# Patient Record
Sex: MALE | Race: WHITE | Employment: STUDENT | URBAN - METROPOLITAN AREA
[De-identification: names, ages, dates, MRNs, and addresses within clinical notes are randomized per-mention and may not be internally consistent; named-entity substitution may affect disease eponyms.]

---
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
## 2019-02-10 ENCOUNTER — OFFICE VISIT (OUTPATIENT)
Dept: FAMILY MEDICINE CLINIC | Facility: CLINIC | Age: 19
End: 2019-02-10
Payer: COMMERCIAL

## 2019-02-10 VITALS
OXYGEN SATURATION: 99 % | DIASTOLIC BLOOD PRESSURE: 76 MMHG | BODY MASS INDEX: 21 KG/M2 | WEIGHT: 158 LBS | RESPIRATION RATE: 14 BRPM | TEMPERATURE: 97 F | SYSTOLIC BLOOD PRESSURE: 103 MMHG | HEART RATE: 87 BPM

## 2019-02-10 DIAGNOSIS — H65.91 RIGHT NON-SUPPURATIVE OTITIS MEDIA: Primary | ICD-10-CM

## 2019-02-10 DIAGNOSIS — R05.9 COUGH: ICD-10-CM

## 2019-02-10 PROCEDURE — 99213 OFFICE O/P EST LOW 20 MIN: CPT | Performed by: PHYSICIAN ASSISTANT

## 2019-02-10 RX ORDER — AMOXICILLIN AND CLAVULANATE POTASSIUM 875; 125 MG/1; MG/1
1 TABLET, FILM COATED ORAL 2 TIMES DAILY
Qty: 20 TABLET | Refills: 0 | Status: SHIPPED | OUTPATIENT
Start: 2019-02-10 | End: 2019-02-20

## 2019-02-10 RX ORDER — DEXTROMETHORPHAN HYDROBROMIDE AND PROMETHAZINE HYDROCHLORIDE 15; 6.25 MG/5ML; MG/5ML
5 SYRUP ORAL 4 TIMES DAILY PRN
Qty: 118 ML | Refills: 0 | Status: SHIPPED | OUTPATIENT
Start: 2019-02-10 | End: 2019-02-20

## 2019-02-10 NOTE — PROGRESS NOTES
CHIEF COMPLAINT:   Patient presents with:  Cough        HPI:   Maxine Giang is a 22year old male who presents for cough for one week. Started with sore throat with white spots. Throat remains sore, but white spots have resolved  .  Headaches at the s Mild erythema of the throat. PND noted. No tonsillar enlargement or exudates   NECK: supple, non-tender. LUNGS: Normal respiratory rate. Normal effort. Dry cough. No wheezing. No rales or crackles. . No decreased BS.    CARDIO: RRR without murmur  LYMPH: N

## 2019-02-10 NOTE — PATIENT INSTRUCTIONS
Rest   Fluids   Ibuprofen/tylenol OTC as needed   Please follow up with PCP if no improvement or if symptoms worsen

## 2019-04-24 ENCOUNTER — OFFICE VISIT (OUTPATIENT)
Dept: FAMILY MEDICINE CLINIC | Facility: CLINIC | Age: 19
End: 2019-04-24
Payer: COMMERCIAL

## 2019-04-24 VITALS
TEMPERATURE: 98 F | HEART RATE: 89 BPM | DIASTOLIC BLOOD PRESSURE: 80 MMHG | RESPIRATION RATE: 18 BRPM | SYSTOLIC BLOOD PRESSURE: 108 MMHG | WEIGHT: 158.63 LBS | BODY MASS INDEX: 21.48 KG/M2 | HEIGHT: 72.24 IN

## 2019-04-24 DIAGNOSIS — Z00.00 WELL ADULT EXAM: Primary | ICD-10-CM

## 2019-04-24 PROCEDURE — 99395 PREV VISIT EST AGE 18-39: CPT | Performed by: FAMILY MEDICINE

## 2019-04-24 NOTE — PROGRESS NOTES
:HPI:   Amanda Horner is a 22year old male who presents for a complete physical exam.     Patient sees derm for acne. Feeling good. No complaints.     Wt Readings from Last 3 Encounters:  04/24/19 : 158 lb 9.6 oz (57 %, Z= 0.17)*  02/10/19 : anemia  ENDOCRINE: denies thyroid history  ALL/ASTHMA: denies hx of allergy or asthma    EXAM:   /80   Pulse 89   Temp 98.1 °F (36.7 °C) (Oral)   Resp 18   Ht 72.24\"   Wt 158 lb 9.6 oz   BMI 21.36 kg/m²   Body mass index is 21.36 kg/m².    GENERAL: w

## 2020-03-14 ENCOUNTER — OFFICE VISIT (OUTPATIENT)
Dept: FAMILY MEDICINE CLINIC | Facility: CLINIC | Age: 20
End: 2020-03-14
Payer: COMMERCIAL

## 2020-03-14 VITALS
HEART RATE: 70 BPM | BODY MASS INDEX: 21.23 KG/M2 | SYSTOLIC BLOOD PRESSURE: 116 MMHG | OXYGEN SATURATION: 98 % | WEIGHT: 160.19 LBS | DIASTOLIC BLOOD PRESSURE: 70 MMHG | HEIGHT: 73 IN | TEMPERATURE: 98 F | RESPIRATION RATE: 16 BRPM

## 2020-03-14 DIAGNOSIS — J01.00 ACUTE NON-RECURRENT MAXILLARY SINUSITIS: Primary | ICD-10-CM

## 2020-03-14 PROCEDURE — 99213 OFFICE O/P EST LOW 20 MIN: CPT | Performed by: PHYSICIAN ASSISTANT

## 2020-03-14 RX ORDER — AMOXICILLIN AND CLAVULANATE POTASSIUM 875; 125 MG/1; MG/1
1 TABLET, FILM COATED ORAL 2 TIMES DAILY
Qty: 20 TABLET | Refills: 0 | Status: SHIPPED | OUTPATIENT
Start: 2020-03-14 | End: 2020-03-24

## 2020-03-14 RX ORDER — BENZONATATE 200 MG/1
200 CAPSULE ORAL 3 TIMES DAILY PRN
Qty: 30 CAPSULE | Refills: 0 | Status: SHIPPED | OUTPATIENT
Start: 2020-03-14 | End: 2020-12-16 | Stop reason: ALTCHOICE

## 2020-03-14 RX ORDER — FLUTICASONE PROPIONATE 50 MCG
2 SPRAY, SUSPENSION (ML) NASAL DAILY
Qty: 1 INHALER | Refills: 0 | Status: SHIPPED | OUTPATIENT
Start: 2020-03-14 | End: 2020-12-16 | Stop reason: ALTCHOICE

## 2020-03-14 NOTE — PROGRESS NOTES
CHIEF COMPLAINT:   Patient presents with:  Cold: flu on last tuesday, pos for flu A, cough, runny nose, sore throat, head pressure, chest pressure when coughing, x 2wks       HPI:   Luba Leon is a 20year old male who presents for upper respirator • WISDOM TEETH REMOVED  2016      No family history on file.    Social History    Tobacco Use      Smoking status: Current Every Day Smoker      Smokeless tobacco: Never Used      Tobacco comment: vape    Alcohol use: No      Alcohol/week: 0.0 standard drin PLAN: Meds as below.   Comfort care instructions as listed in Patient Instructions    Meds & Refills for this Visit:  Requested Prescriptions     Signed Prescriptions Disp Refills   • Amoxicillin-Pot Clavulanate 875-125 MG Oral Tab 20 tablet 0     Sig: Take ABRS may be diagnosed if you’ve had an upper respiratory infection like a cold and cough for longer than 10 to 14 days. Your health care provider will ask about your symptoms and your medical history.  The provider will check your vital signs, including you

## 2020-04-06 ENCOUNTER — TELEPHONE (OUTPATIENT)
Dept: FAMILY MEDICINE CLINIC | Facility: CLINIC | Age: 20
End: 2020-04-06

## 2020-04-06 NOTE — TELEPHONE ENCOUNTER
He does not meet criteria as long as he is not having significant shortness of breath. Young, otherwise healthy. Would recommend staying self isolated and following COVID precautions.     Thanks,  Carlita Knapp

## 2020-04-06 NOTE — TELEPHONE ENCOUNTER
Called and discussed this with the patient and told him he does not meet criteria for testing at this time.  If things change either symptoms or criteria he should call back

## 2020-04-06 NOTE — TELEPHONE ENCOUNTER
Virtual/Telephone Check-In    [address] verbally consents to a Virtual/Telephone Check-In service on 4/6/2020. Patient understands and accepts financial responsibility for any deductible, co-insurance and/or co-pays associated with this service.

## 2020-08-03 ENCOUNTER — TELEPHONE (OUTPATIENT)
Dept: FAMILY MEDICINE CLINIC | Facility: CLINIC | Age: 20
End: 2020-08-03

## 2020-08-03 DIAGNOSIS — J02.9 SORE THROAT: ICD-10-CM

## 2020-08-03 DIAGNOSIS — Z20.822 ENCOUNTER FOR SCREENING LABORATORY TESTING FOR COVID-19 VIRUS: Primary | ICD-10-CM

## 2020-08-03 DIAGNOSIS — R51.9 ACUTE NONINTRACTABLE HEADACHE, UNSPECIFIED HEADACHE TYPE: Primary | ICD-10-CM

## 2020-08-03 DIAGNOSIS — Z20.822 EXPOSURE TO COVID-19 VIRUS: ICD-10-CM

## 2020-08-03 NOTE — TELEPHONE ENCOUNTER
Pt states he is having symptoms. Headache, sore throat and  runny nose. Was been exposed 3 times this wk to someone with Covid.  Requesting to get tested

## 2020-08-03 NOTE — TELEPHONE ENCOUNTER
Patient was hanging out with friends on Friday and 3 of the have tested + for covid, He now states he has runny nose sore throat and headache and wants to be tested for covid.

## 2020-08-03 NOTE — TELEPHONE ENCOUNTER
Called and talked to mother and they are currently not testing people with out symptoms suggested they go to the [facility] testing sight to be seen if she wants. Otherwise wait 5 days to see if symptoms develop.

## 2020-08-03 NOTE — TELEPHONE ENCOUNTER
Patient's mother called states family was exposed to [employer] this past Saturday, currently not showing symptoms but is requesting to be tested for COVID.

## 2020-08-04 ENCOUNTER — LAB ENCOUNTER (OUTPATIENT)
Dept: LAB | Facility: HOSPITAL | Age: 20
End: 2020-08-04
Attending: NURSE PRACTITIONER
Payer: COMMERCIAL

## 2020-08-04 DIAGNOSIS — R51.9 ACUTE NONINTRACTABLE HEADACHE, UNSPECIFIED HEADACHE TYPE: ICD-10-CM

## 2020-08-04 DIAGNOSIS — Z20.822 EXPOSURE TO COVID-19 VIRUS: ICD-10-CM

## 2020-08-04 DIAGNOSIS — J02.9 SORE THROAT: ICD-10-CM

## 2020-08-04 LAB — SARS-COV-2 RNA RESP QL NAA+PROBE: NOT DETECTED

## 2020-12-16 ENCOUNTER — OFFICE VISIT (OUTPATIENT)
Dept: FAMILY MEDICINE CLINIC | Facility: CLINIC | Age: 20
End: 2020-12-16
Payer: COMMERCIAL

## 2020-12-16 VITALS
RESPIRATION RATE: 16 BRPM | SYSTOLIC BLOOD PRESSURE: 100 MMHG | HEIGHT: 72.5 IN | HEART RATE: 61 BPM | TEMPERATURE: 98 F | DIASTOLIC BLOOD PRESSURE: 64 MMHG | WEIGHT: 162 LBS | BODY MASS INDEX: 21.7 KG/M2 | OXYGEN SATURATION: 98 %

## 2020-12-16 DIAGNOSIS — S09.93XA INJURY OF JAW, INITIAL ENCOUNTER: ICD-10-CM

## 2020-12-16 DIAGNOSIS — M26.52 LIMITED JAW ROM: ICD-10-CM

## 2020-12-16 DIAGNOSIS — Z23 FLU VACCINE NEED: ICD-10-CM

## 2020-12-16 DIAGNOSIS — Z00.00 ROUTINE GENERAL MEDICAL EXAMINATION AT A HEALTH CARE FACILITY: Primary | ICD-10-CM

## 2020-12-16 DIAGNOSIS — R68.84 CHRONIC JAW PAIN: ICD-10-CM

## 2020-12-16 DIAGNOSIS — G89.29 CHRONIC JAW PAIN: ICD-10-CM

## 2020-12-16 PROCEDURE — 90686 IIV4 VACC NO PRSV 0.5 ML IM: CPT | Performed by: FAMILY MEDICINE

## 2020-12-16 PROCEDURE — 3008F BODY MASS INDEX DOCD: CPT | Performed by: FAMILY MEDICINE

## 2020-12-16 PROCEDURE — 3074F SYST BP LT 130 MM HG: CPT | Performed by: FAMILY MEDICINE

## 2020-12-16 PROCEDURE — 3078F DIAST BP <80 MM HG: CPT | Performed by: FAMILY MEDICINE

## 2020-12-16 PROCEDURE — 99395 PREV VISIT EST AGE 18-39: CPT | Performed by: FAMILY MEDICINE

## 2020-12-16 PROCEDURE — 90471 IMMUNIZATION ADMIN: CPT | Performed by: FAMILY MEDICINE

## 2020-12-16 NOTE — PROGRESS NOTES
:HPI:   Kermit Britton is a 23year old male who presents for a complete physical exam.       Had Covid in the spring. Plans to quit vaping 1/1/2021. Jaw injury at age 12 years. Jumped into pool and knee hit right side of jaw.   Now jaw will get hx of anemia  ENDOCRINE: denies thyroid history  ALL/ASTHMA: denies hx of allergy or asthma    EXAM:   /64   Pulse 61   Temp 97.6 °F (36.4 °C) (Temporal)   Resp 16   Ht 6' 0.5\" (1.842 m)   Wt 162 lb (73.5 kg)   SpO2 98%   BMI 21.67 kg/m²   Body mass exercise and diet. The patient indicates understanding of these issues and agrees to the plan. The patient is asked to return for CPX annually and sooner if needed.

## 2021-05-25 ENCOUNTER — IMMUNIZATION (OUTPATIENT)
Dept: LAB | Facility: HOSPITAL | Age: 21
End: 2021-05-25
Attending: EMERGENCY MEDICINE
Payer: COMMERCIAL

## 2021-05-25 DIAGNOSIS — Z23 NEED FOR VACCINATION: Primary | ICD-10-CM

## 2021-05-25 PROCEDURE — 0001A SARSCOV2 VAC 30MCG/0.3ML IM: CPT

## 2021-06-15 ENCOUNTER — IMMUNIZATION (OUTPATIENT)
Dept: LAB | Facility: HOSPITAL | Age: 21
End: 2021-06-15
Attending: EMERGENCY MEDICINE
Payer: COMMERCIAL

## 2021-06-15 DIAGNOSIS — Z23 NEED FOR VACCINATION: Primary | ICD-10-CM

## 2021-06-15 PROCEDURE — 0002A SARSCOV2 VAC 30MCG/0.3ML IM: CPT

## 2022-05-22 ENCOUNTER — HOSPITAL ENCOUNTER (OUTPATIENT)
Age: 22
Discharge: HOME | End: 2022-05-22
Attending: EMERGENCY MEDICINE
Payer: COMMERCIAL

## 2022-05-22 VITALS
SYSTOLIC BLOOD PRESSURE: 121 MMHG | HEART RATE: 93 BPM | BODY MASS INDEX: 22.53 KG/M2 | HEIGHT: 73 IN | OXYGEN SATURATION: 97 % | TEMPERATURE: 99 F | WEIGHT: 170 LBS | RESPIRATION RATE: 14 BRPM | DIASTOLIC BLOOD PRESSURE: 70 MMHG

## 2022-05-22 DIAGNOSIS — B34.9 VIRAL SYNDROME: Primary | ICD-10-CM

## 2022-05-22 DIAGNOSIS — J34.89 RHINORRHEA: ICD-10-CM

## 2022-05-22 PROCEDURE — 99213 OFFICE O/P EST LOW 20 MIN: CPT | Performed by: EMERGENCY MEDICINE

## 2022-05-22 RX ORDER — MOMETASONE FUROATE 50 UG/1
1 SPRAY, METERED NASAL DAILY
Qty: 1 EACH | Refills: 0 | Status: SHIPPED | OUTPATIENT
Start: 2022-05-22

## 2022-05-22 RX ORDER — PSEUDOEPHEDRINE HYDROCHLORIDE 30 MG/1
30 TABLET ORAL EVERY 4 HOURS PRN
Qty: 36 TABLET | Refills: 0 | Status: SHIPPED | OUTPATIENT
Start: 2022-05-22 | End: 2022-06-21

## 2022-05-22 NOTE — ED INITIAL ASSESSMENT (HPI)
Head congestion, sinus congestion w post nasal drip, Neg home Covid test x 2 last on 5/20. Minimal relief w OTC cold & flu meds.

## 2023-02-27 ENCOUNTER — OFFICE VISIT (OUTPATIENT)
Dept: FAMILY MEDICINE CLINIC | Facility: CLINIC | Age: 23
End: 2023-02-27
Payer: COMMERCIAL

## 2023-02-27 ENCOUNTER — PATIENT MESSAGE (OUTPATIENT)
Dept: FAMILY MEDICINE CLINIC | Facility: CLINIC | Age: 23
End: 2023-02-27

## 2023-02-27 VITALS
HEIGHT: 73 IN | BODY MASS INDEX: 22.8 KG/M2 | WEIGHT: 172 LBS | HEART RATE: 88 BPM | DIASTOLIC BLOOD PRESSURE: 70 MMHG | RESPIRATION RATE: 16 BRPM | SYSTOLIC BLOOD PRESSURE: 120 MMHG

## 2023-02-27 DIAGNOSIS — Z00.00 ROUTINE PHYSICAL EXAMINATION: Primary | ICD-10-CM

## 2023-02-27 DIAGNOSIS — Z13.21 ENCOUNTER FOR VITAMIN DEFICIENCY SCREENING: Primary | ICD-10-CM

## 2023-02-27 PROCEDURE — 99395 PREV VISIT EST AGE 18-39: CPT | Performed by: NURSE PRACTITIONER

## 2023-02-27 PROCEDURE — 3078F DIAST BP <80 MM HG: CPT | Performed by: NURSE PRACTITIONER

## 2023-02-27 PROCEDURE — 3008F BODY MASS INDEX DOCD: CPT | Performed by: NURSE PRACTITIONER

## 2023-02-27 PROCEDURE — 3074F SYST BP LT 130 MM HG: CPT | Performed by: NURSE PRACTITIONER

## 2023-02-27 NOTE — TELEPHONE ENCOUNTER
From: Akash Bass  To: Devon Garnica NP  Sent: 2/27/2023 9:32 AM CST  Subject: karlene Patterson, can you please add vitamin D to my lab work thank you

## 2023-03-02 ENCOUNTER — LAB ENCOUNTER (OUTPATIENT)
Dept: LAB | Facility: REFERENCE LAB | Age: 23
End: 2023-03-02
Attending: NURSE PRACTITIONER
Payer: COMMERCIAL

## 2023-03-02 DIAGNOSIS — Z00.00 ROUTINE PHYSICAL EXAMINATION: ICD-10-CM

## 2023-03-02 LAB
ALBUMIN SERPL-MCNC: 4.3 G/DL (ref 3.4–5)
ALBUMIN/GLOB SERPL: 1.1 {RATIO} (ref 1–2)
ALP LIVER SERPL-CCNC: 63 U/L
ALT SERPL-CCNC: 18 U/L
ANION GAP SERPL CALC-SCNC: 7 MMOL/L (ref 0–18)
AST SERPL-CCNC: 14 U/L (ref 15–37)
BASOPHILS # BLD AUTO: 0.06 X10(3) UL (ref 0–0.2)
BASOPHILS NFR BLD AUTO: 0.8 %
BILIRUB SERPL-MCNC: 0.8 MG/DL (ref 0.1–2)
BUN BLD-MCNC: 15 MG/DL (ref 7–18)
BUN/CREAT SERPL: 11.9 (ref 10–20)
CALCIUM BLD-MCNC: 9.8 MG/DL (ref 8.5–10.1)
CHLORIDE SERPL-SCNC: 107 MMOL/L (ref 98–112)
CO2 SERPL-SCNC: 26 MMOL/L (ref 21–32)
CREAT BLD-MCNC: 1.26 MG/DL
DEPRECATED RDW RBC AUTO: 37.9 FL (ref 35.1–46.3)
EOSINOPHIL # BLD AUTO: 0.78 X10(3) UL (ref 0–0.7)
EOSINOPHIL NFR BLD AUTO: 10.2 %
ERYTHROCYTE [DISTWIDTH] IN BLOOD BY AUTOMATED COUNT: 12.2 % (ref 11–15)
FASTING STATUS PATIENT QL REPORTED: YES
GFR SERPLBLD BASED ON 1.73 SQ M-ARVRAT: 82 ML/MIN/1.73M2 (ref 60–?)
GLOBULIN PLAS-MCNC: 3.8 G/DL (ref 2.8–4.4)
GLUCOSE BLD-MCNC: 72 MG/DL (ref 70–99)
HCT VFR BLD AUTO: 46.9 %
HGB BLD-MCNC: 16.4 G/DL
IMM GRANULOCYTES # BLD AUTO: 0.01 X10(3) UL (ref 0–1)
IMM GRANULOCYTES NFR BLD: 0.1 %
LYMPHOCYTES # BLD AUTO: 2.98 X10(3) UL (ref 1–4)
LYMPHOCYTES NFR BLD AUTO: 39 %
MCH RBC QN AUTO: 30 PG (ref 26–34)
MCHC RBC AUTO-ENTMCNC: 35 G/DL (ref 31–37)
MCV RBC AUTO: 85.9 FL
MONOCYTES # BLD AUTO: 0.44 X10(3) UL (ref 0.1–1)
MONOCYTES NFR BLD AUTO: 5.8 %
NEUTROPHILS # BLD AUTO: 3.37 X10 (3) UL (ref 1.5–7.7)
NEUTROPHILS # BLD AUTO: 3.37 X10(3) UL (ref 1.5–7.7)
NEUTROPHILS NFR BLD AUTO: 44.1 %
OSMOLALITY SERPL CALC.SUM OF ELEC: 289 MOSM/KG (ref 275–295)
PLATELET # BLD AUTO: 264 10(3)UL (ref 150–450)
POTASSIUM SERPL-SCNC: 3.8 MMOL/L (ref 3.5–5.1)
PROT SERPL-MCNC: 8.1 G/DL (ref 6.4–8.2)
RBC # BLD AUTO: 5.46 X10(6)UL
SODIUM SERPL-SCNC: 140 MMOL/L (ref 136–145)
TSI SER-ACNC: 1.52 MIU/ML (ref 0.36–3.74)
WBC # BLD AUTO: 7.6 X10(3) UL (ref 4–11)

## 2023-03-02 PROCEDURE — 84443 ASSAY THYROID STIM HORMONE: CPT

## 2023-03-02 PROCEDURE — 80053 COMPREHEN METABOLIC PANEL: CPT

## 2023-03-02 PROCEDURE — 85025 COMPLETE CBC W/AUTO DIFF WBC: CPT

## 2023-09-05 ENCOUNTER — OFFICE VISIT (OUTPATIENT)
Dept: FAMILY MEDICINE CLINIC | Facility: CLINIC | Age: 23
End: 2023-09-05
Payer: COMMERCIAL

## 2023-09-05 VITALS
DIASTOLIC BLOOD PRESSURE: 60 MMHG | TEMPERATURE: 97 F | SYSTOLIC BLOOD PRESSURE: 100 MMHG | RESPIRATION RATE: 16 BRPM | OXYGEN SATURATION: 97 % | BODY MASS INDEX: 23.06 KG/M2 | HEART RATE: 86 BPM | WEIGHT: 174 LBS | HEIGHT: 73 IN

## 2023-09-05 DIAGNOSIS — Z80.43 FAMILY HISTORY OF TESTICULAR CANCER: ICD-10-CM

## 2023-09-05 DIAGNOSIS — N50.82 SCROTAL PAIN: Primary | ICD-10-CM

## 2023-09-05 PROCEDURE — 3008F BODY MASS INDEX DOCD: CPT | Performed by: NURSE PRACTITIONER

## 2023-09-05 PROCEDURE — 3074F SYST BP LT 130 MM HG: CPT | Performed by: NURSE PRACTITIONER

## 2023-09-05 PROCEDURE — 99213 OFFICE O/P EST LOW 20 MIN: CPT | Performed by: NURSE PRACTITIONER

## 2023-09-05 PROCEDURE — 3078F DIAST BP <80 MM HG: CPT | Performed by: NURSE PRACTITIONER

## 2023-09-13 ENCOUNTER — PATIENT MESSAGE (OUTPATIENT)
Dept: FAMILY MEDICINE CLINIC | Facility: CLINIC | Age: 23
End: 2023-09-13

## 2023-09-13 DIAGNOSIS — N50.82 SCROTAL PAIN: Primary | ICD-10-CM

## 2023-09-16 ENCOUNTER — LAB ENCOUNTER (OUTPATIENT)
Dept: LAB | Facility: REFERENCE LAB | Age: 23
End: 2023-09-16
Attending: FAMILY MEDICINE
Payer: COMMERCIAL

## 2023-09-16 DIAGNOSIS — N50.82 SCROTAL PAIN: ICD-10-CM

## 2023-09-16 LAB
BILIRUB UR QL: NEGATIVE
CLARITY UR: CLEAR
GLUCOSE UR-MCNC: NORMAL MG/DL
HGB UR QL STRIP.AUTO: NEGATIVE
KETONES UR-MCNC: NEGATIVE MG/DL
LEUKOCYTE ESTERASE UR QL STRIP.AUTO: NEGATIVE
NITRITE UR QL STRIP.AUTO: NEGATIVE
PH UR: 5.5 [PH] (ref 5–8)
PROT UR-MCNC: NEGATIVE MG/DL
SP GR UR STRIP: 1.01 (ref 1–1.03)
UROBILINOGEN UR STRIP-ACNC: NORMAL

## 2023-09-19 ENCOUNTER — HOSPITAL ENCOUNTER (OUTPATIENT)
Dept: ULTRASOUND IMAGING | Age: 23
Discharge: HOME | End: 2023-09-19
Attending: NURSE PRACTITIONER
Payer: COMMERCIAL

## 2023-09-19 DIAGNOSIS — N50.82 SCROTAL PAIN: ICD-10-CM

## 2023-09-19 PROCEDURE — 76870 US EXAM SCROTUM: CPT | Performed by: NURSE PRACTITIONER

## 2023-09-19 PROCEDURE — 93975 VASCULAR STUDY: CPT | Performed by: NURSE PRACTITIONER

## 2023-10-17 ENCOUNTER — PATIENT MESSAGE (OUTPATIENT)
Dept: FAMILY MEDICINE CLINIC | Facility: CLINIC | Age: 23
End: 2023-10-17

## 2023-10-18 NOTE — TELEPHONE ENCOUNTER
From: Melissa Feliz  To: Lauro Kenney  Sent: 10/17/2023 6:30 PM CDT  Subject: Medical Question    I have a question. I have been freaking out about possibly having a brain tumor and i do not know why. Any feedback you have would be very helpful. Thank you doctor.

## 2023-10-18 NOTE — TELEPHONE ENCOUNTER
Pt c/o mild headaches since yesterday. No blurred vision, no slurred speech, slight lightheaded feeling, no fevers, no cold symptoms, advil help with headaches. Pt states stopped Nicotine and caffeine over the last 24 hours. Pt used to have several caffeinated drinks/pre-workout powders daily and would vape nicotine several times a day. Pt states he stopped . Explained headaches are due more to the withdraw of Nicotine and Caffeine. Advised pushing fluids, Advil as needed, keep us updated.

## 2023-10-23 NOTE — TELEPHONE ENCOUNTER
Called pt. He reports headaches have gone away. He is quitting nicotine - has been using it daily since he was 17-18. Has been having anxiety, fast heart rate since stopping 6 days ago. Notes increased heart rate when going from sitting to standing. Has been drinking a lot of water. Having trouble concentrating. C/o brain fog. He notes all recent labs were normal. Encouraged pt schedule appt given his concerns. He verbalized understanding and agrees with plan. Call transferred to front staff for scheduling.

## 2023-11-09 ENCOUNTER — OFFICE VISIT (OUTPATIENT)
Dept: SURGERY | Facility: CLINIC | Age: 23
End: 2023-11-09

## 2023-11-09 DIAGNOSIS — N45.1 EPIDIDYMITIS: Primary | ICD-10-CM

## 2023-11-09 PROCEDURE — 99244 OFF/OP CNSLTJ NEW/EST MOD 40: CPT | Performed by: SURGERY

## 2023-11-09 NOTE — PROGRESS NOTES
Urology Clinic Note - New Patient    Referring Provider:  No referring provider defined for this encounter. Primary Care Provider:  Hermelindo Hammer MD     Chief Complaint:   Scrotal pain    HPI:   Lizz Burch is a 24year old male with no PMH referred for scrotal pain. I reviewed his scrotal ultrasound from 9/19/2023 and this showed a small 7mm left epididymal head cyst, otherwise normal.       A few weeks ago he noticed right-sided testicular pain that eventually went away. More recently he had left-sided testicular pain this is starting to improve as well. The pain is dull and mild in nature. He also notices occasional urinary frequency, but feels he has been drinking more water recently. Family history of malignancy: Prostate cancer in grandfather    PSA:  No results found for: \"PSA\", \"PERCENTPSA\", \"PSAS\", \"PSAULTRA\", \"QPSA\", \"PSATOT\", \"TOTPSADX\", \"TOTPSASCREEN\"     History:   No past medical history on file. Past Surgical History:   Procedure Laterality Date    REMV PILONIDAL LESION SIMPLE N/A 12/22/2017    WISDOM TEETH REMOVED  2016       No family history on file. Social History     Socioeconomic History    Marital status: Single    Number of children: 0   Occupational History    Occupation: Student     Comment: senior   Tobacco Use    Smoking status: Never    Smokeless tobacco: Never   Vaping Use    Vaping Use: Some days    Substances: Nicotine, quite 2uo3ctc 17    Devices: Pre-filled or refillable cartridge   Substance and Sexual Activity    Alcohol use:  Yes     Alcohol/week: 14.0 standard drinks of alcohol     Types: 14 Cans of beer per week    Drug use: No    Sexual activity: Not Currently     Partners: Female   Other Topics Concern    Caffeine Concern Yes     Comment: 1 cup of coffee, prework out mixture    Exercise Yes     Comment: 7 days weekly    Seat Belt Yes       Medications (Active prior to today's visit):  Current Outpatient Medications   Medication Sig Dispense Refill escitalopram 10 MG Oral Tab Take 1 tablet (10 mg total) by mouth daily. 90 tablet 0    mometasone furoate 50 MCG/ACT Nasal Suspension 1 spray by Nasal route daily. 1 each 0    Sulfacetamide Sodium, Acne, (KLARON) 10 % External Lotion Apply to face once daily 118 mL 2    Adapalene 0.3 % External Gel Apply QHS to face, chest, and back 45 g 2       Allergies:  No Known Allergies      Review of Systems:   A comprehensive 10-point review of systems was completed. Pertinent positives and negatives are noted in the the HPI. Physical Exam:   CONSTITUTIONAL: Well developed, well nourished, in no acute distress  NEUROLOGIC: Alert and oriented  HEAD: Normocephalic, atraumatic  EYES: Sclera non-icteric  ENT: Hearing intact, moist mucous membranes  NECK: No obvious goiter or masses  RESPIRATORY: Normal respiratory effort  SKIN: No evident rashes  ABDOMEN: Soft, non-tender, non-distended  GENITOURINARY: Normal phallus, orthotopic meatus, normal bilateral testicles, mild left testicular and epididymal tenderness    Assessment & Plan:   Lillian Middleton is a 24year old male with no PMH referred for scrotal pain. I reviewed his scrotal ultrasound from 9/19/2023 and this showed a small 7mm left epididymal head cyst, otherwise normal.       A few weeks ago he noticed right-sided testicular pain that eventually went away. More recently he had left-sided testicular pain this is starting to improve as well. The pain is dull and mild in nature. He also notices occasional urinary frequency, but feels he has been drinking more water recently. I discussed that his symptoms are likely related to mild epididymal orchitis. His very small epididymal cyst is nothing to worry about at this time. -NSAIDs and warm baths as needed for flareups of epididymitis  -Avoid prolonged sitting  -PSA screening at age 39 given family history of prostate cancer      Thank you for this consult.     I have personally reviewed all relevant medical records, labs, and imaging. Medical Decision Making  Epididymitis: Undiagnosed new problem  Epididymal cyst: Undiagnosed new problem    Amount and/or Complexity of Data Reviewed  External Data Reviewed: notes. Radiology: independent interpretation performed. Aarti Funk.  Horacio Arceo MD  Staff Urologist  [facility]  Office: [phone]

## 2023-12-18 ENCOUNTER — APPOINTMENT (OUTPATIENT)
Dept: GENERAL RADIOLOGY | Age: 23
End: 2023-12-18
Attending: NURSE PRACTITIONER
Payer: COMMERCIAL

## 2023-12-18 ENCOUNTER — HOSPITAL ENCOUNTER (OUTPATIENT)
Age: 23
Discharge: HOME | End: 2023-12-18
Payer: COMMERCIAL

## 2023-12-18 VITALS
DIASTOLIC BLOOD PRESSURE: 84 MMHG | SYSTOLIC BLOOD PRESSURE: 135 MMHG | RESPIRATION RATE: 18 BRPM | HEART RATE: 75 BPM | OXYGEN SATURATION: 97 % | TEMPERATURE: 99 F

## 2023-12-18 DIAGNOSIS — R05.1 ACUTE COUGH: Primary | ICD-10-CM

## 2023-12-18 DIAGNOSIS — B34.9 VIRAL SYNDROME: ICD-10-CM

## 2023-12-18 LAB — SARS-COV-2 RNA RESP QL NAA+PROBE: NOT DETECTED

## 2023-12-18 PROCEDURE — 99213 OFFICE O/P EST LOW 20 MIN: CPT | Performed by: NURSE PRACTITIONER

## 2023-12-18 PROCEDURE — U0002 COVID-19 LAB TEST NON-CDC: HCPCS | Performed by: NURSE PRACTITIONER

## 2023-12-18 PROCEDURE — 71046 X-RAY EXAM CHEST 2 VIEWS: CPT | Performed by: NURSE PRACTITIONER

## 2023-12-18 NOTE — DISCHARGE INSTRUCTIONS
COVID test is negative. No pneumonia seen on the x-ray. Symptoms are viral.  Use over-the-counter cough medicine such as guaifenesin or Delsym. Try honey, cough drops, increase water intake.   Follow-up with your primary doctor for persistent symptoms

## 2023-12-18 NOTE — ED INITIAL ASSESSMENT (HPI)
Pt has ha d a cough for a week that started with a headache and fever, but cough is now deep and productive and hurts to cough.

## 2024-02-23 ENCOUNTER — TELEPHONE (OUTPATIENT)
Dept: FAMILY MEDICINE CLINIC | Facility: CLINIC | Age: 24
End: 2024-02-23

## 2024-02-23 NOTE — TELEPHONE ENCOUNTER
Pt is calling he is having daily issues upon standing and sitting, his heart rate is rising he said he also gets brain fog and headaches.  It has been happening for a while but not daily and more frequently.

## 2024-02-27 ENCOUNTER — OFFICE VISIT (OUTPATIENT)
Dept: FAMILY MEDICINE CLINIC | Facility: CLINIC | Age: 24
End: 2024-02-27
Payer: COMMERCIAL

## 2024-02-27 VITALS
SYSTOLIC BLOOD PRESSURE: 120 MMHG | HEART RATE: 95 BPM | OXYGEN SATURATION: 97 % | HEIGHT: 73 IN | WEIGHT: 179.38 LBS | RESPIRATION RATE: 16 BRPM | DIASTOLIC BLOOD PRESSURE: 80 MMHG | BODY MASS INDEX: 23.78 KG/M2

## 2024-02-27 DIAGNOSIS — R00.0 TACHYCARDIA: ICD-10-CM

## 2024-02-27 DIAGNOSIS — Z00.00 LABORATORY EXAM ORDERED AS PART OF ROUTINE GENERAL MEDICAL EXAMINATION: Primary | ICD-10-CM

## 2024-02-27 PROCEDURE — 99215 OFFICE O/P EST HI 40 MIN: CPT | Performed by: FAMILY MEDICINE

## 2024-02-27 NOTE — PROGRESS NOTES
Chief Complaint   Patient presents with    Headache     Patient here for headaches with brain fog       HPI:   Thad Bass is a 23 year old male who presents for headache, palpitations.    Headache - improving some.  Going from daily to a few times a week.    Heart - when getting up, heart rate increases pretty significantly.  Since October.  Can raise 30bpm.  Sometimes associated with lightheaded, dizziness.  HR in the 130-140s.  No syncope from this.    When in the shower/hot environments, thi scan occur as well.  Can notice his feet get a purple tone when in the shower.  Laying down helps all his symptoms.    In October he was ill.  Never tested for any specific illnesses, but it was likely COVID as his mother was ill shortly after and was COVID +.    Symptoms seem to come and go.    Caffeine intake is decreased.    Sleep is variable.  Sometimes sleeps longer than usual, sometimes waking up in the night.      Anxiety - never started the med.  He has a stable controlled anxiety    Wt Readings from Last 6 Encounters:   02/27/24 179 lb 6 oz (81.4 kg)   10/30/23 176 lb (79.8 kg)   09/05/23 174 lb (78.9 kg)   02/27/23 172 lb (78 kg)   05/22/22 170 lb (77.1 kg)   12/16/20 162 lb (73.5 kg)     Body mass index is 23.67 kg/m².     Chemistry Labs:   Lab Results   Component Value Date/Time    GLU 72 03/02/2023 09:59 AM     03/02/2023 09:59 AM    K 3.8 03/02/2023 09:59 AM     03/02/2023 09:59 AM    CO2 26.0 03/02/2023 09:59 AM    CREATSERUM 1.26 03/02/2023 09:59 AM    CA 9.8 03/02/2023 09:59 AM    ALB 4.3 03/02/2023 09:59 AM    TP 8.1 03/02/2023 09:59 AM    ALKPHO 63 03/02/2023 09:59 AM    AST 14 (L) 03/02/2023 09:59 AM    ALT 18 03/02/2023 09:59 AM    BILT 0.8 03/02/2023 09:59 AM          Cholesterol  (most recent labs)   No results found for: \"CHOLEST\", \"HDL\", \"LDL\", \"TRIG\"   No results found for: \"PSA\"      Current Outpatient Medications   Medication Sig Dispense Refill    escitalopram 10 MG Oral  Tab Take 1 tablet (10 mg total) by mouth daily. 90 tablet 0    mometasone furoate 50 MCG/ACT Nasal Suspension 1 spray by Nasal route daily. 1 each 0    Sulfacetamide Sodium, Acne, (KLARON) 10 % External Lotion Apply to face once daily 118 mL 2    Adapalene 0.3 % External Gel Apply QHS to face, chest, and back 45 g 2      History reviewed. No pertinent past medical history.   Past Surgical History:   Procedure Laterality Date    REMV PILONIDAL LESION SIMPLE N/A 12/22/2017    WISDOM TEETH REMOVED  2016      History reviewed. No pertinent family history.   Social History:  Social History     Socioeconomic History    Marital status: Single    Number of children: 0   Occupational History    Occupation: Student     Comment: senior   Tobacco Use    Smoking status: Never    Smokeless tobacco: Never   Vaping Use    Vaping Use: Former    Substances: Nicotine, quite 2bg5umd 17    Devices: Pre-filled or refillable cartridge   Substance and Sexual Activity    Alcohol use: Yes     Alcohol/week: 14.0 standard drinks of alcohol     Types: 14 Cans of beer per week    Drug use: No    Sexual activity: Not Currently     Partners: Female   Other Topics Concern    Caffeine Concern Yes     Comment: 2-3 week if that    Exercise Yes     Comment: 3-4 days weekly    Seat Belt Yes      Occ: basement remodeling - [employer]  : no. Children: no.   Exercise: limited with the above symptoms.  3-4 days a week.  No longer taking preworkout or vaping.    Diet: \"could be better\".      REVIEW OF SYSTEMS:     All systems reviewed, negative other than noted above.    EXAM:   /80   Pulse 95   Resp 16   Ht 6' 1\" (1.854 m)   Wt 179 lb 6 oz (81.4 kg)   SpO2 97%   BMI 23.67 kg/m²   Body mass index is 23.67 kg/m².     General appearance: alert, appears stated age and cooperative  Eyes: conjunctivae/corneas clear. PERRL, EOM's intact.   Ears: normal TM's and external ear canals both ears  Neck: no adenopathy, no JVD, supple, symmetrical, trachea  midline and thyroid not enlarged, symmetric, no tenderness/mass/nodules  Lungs: clear to auscultation bilaterally  Heart: S1, S2 normal, no murmur, click, rub or gallop, regular rate and rhythm  Abdomen: soft, non-tender; bowel sounds normal; no masses,  no organomegaly  Extremities: extremities normal, atraumatic, no cyanosis or edema  Pulses: 2+ and symmetric  Neurologic: Alert and oriented X 3, normal strength and tone. Normal symmetric reflexes. Normal coordination and gait     ASSESSMENT AND PLAN:     Thad Hussein Bass was seen in the office today:  had concerns including Headache (Patient here for headaches with brain fog ).    2. Laboratory exam ordered as part of routine general medical examination  Routine labs to evaluate general physical as well as the above mentioned condition/tachycardia.   - CBC, Platelet; No Differential; Future  - Comp Metabolic Panel (14); Future  - Hemoglobin A1C; Future  - Lipid Panel; Future  - TSH W Reflex To Free T4; Future  - Vitamin D; Future  - Urinalysis, Routine; Future  - Connective Tissue Disease (GUMARO) Screen [E]; Future    3. Tachycardia  Unclear cause  Seems to have started after infection in the fall - likely COVID  Symptoms on and off and variable intensity since that time  No clear trigger with sleep, alcohol intake, anxiety or anything  At this point, will check labs  Emphasis on healthy fluid intake, add liquid IV or other electrolyte or salt replenishment.  He did mention that salt and hydration seems to help symptoms  Heart having exaggerated response to stimuli.  When HR normally increases, it does significantly more.  Discussed BB.  Discussed possible workups.   If labs normal, encourage him to see the cardiology team.   Anxiety has been diagnosed.  Feels its pretty well controlled and stable at the present.  Has lexapro order if anxiety increases.  If workup benign and cardiology testing all normal, might consider starting the medicine.   - Cardio  Referral - Internal    Generally healthy  Regular gym, but cut back due to the heart symptoms he is having.  Emphasis on healthy diet.  Being mindful of caffeine, alcohol.  Applauded him cutting out the vaping.       Jasvir Silveira M.D.   [facility] 3  02/27/24      Office visit lasted 41 minutes, of which this time was spent counseling the patient on the symptoms, duration, triggers, variability.  We discussed his interventions so far, possible meds that may help (BB and SSRIs), role of labs, seeing cardiology

## 2024-03-01 ENCOUNTER — APPOINTMENT (OUTPATIENT)
Dept: GENERAL RADIOLOGY | Age: 24
End: 2024-03-01
Attending: PHYSICIAN ASSISTANT
Payer: COMMERCIAL

## 2024-03-01 ENCOUNTER — HOSPITAL ENCOUNTER (OUTPATIENT)
Age: 24
Discharge: HOME | End: 2024-03-01
Payer: COMMERCIAL

## 2024-03-01 ENCOUNTER — LAB ENCOUNTER (OUTPATIENT)
Dept: LAB | Age: 24
End: 2024-03-01
Attending: FAMILY MEDICINE
Payer: COMMERCIAL

## 2024-03-01 VITALS
TEMPERATURE: 98 F | RESPIRATION RATE: 24 BRPM | HEART RATE: 110 BPM | DIASTOLIC BLOOD PRESSURE: 85 MMHG | OXYGEN SATURATION: 97 % | SYSTOLIC BLOOD PRESSURE: 146 MMHG

## 2024-03-01 DIAGNOSIS — Z00.00 LABORATORY EXAM ORDERED AS PART OF ROUTINE GENERAL MEDICAL EXAMINATION: ICD-10-CM

## 2024-03-01 DIAGNOSIS — M79.671 RIGHT FOOT PAIN: ICD-10-CM

## 2024-03-01 DIAGNOSIS — M10.9 GOUTY ARTHRITIS OF RIGHT FOOT: Primary | ICD-10-CM

## 2024-03-01 LAB
ALBUMIN SERPL-MCNC: 4.6 G/DL (ref 3.4–5)
ALBUMIN/GLOB SERPL: 1.1 {RATIO} (ref 1–2)
ALP LIVER SERPL-CCNC: 72 U/L
ALT SERPL-CCNC: 27 U/L
ANION GAP SERPL CALC-SCNC: 10 MMOL/L (ref 0–18)
AST SERPL-CCNC: 20 U/L (ref 15–37)
BILIRUB SERPL-MCNC: 0.9 MG/DL (ref 0.1–2)
BILIRUB UR QL STRIP.AUTO: NEGATIVE
BUN BLD-MCNC: 12 MG/DL (ref 9–23)
CALCIUM BLD-MCNC: 10 MG/DL (ref 8.5–10.1)
CHLORIDE SERPL-SCNC: 105 MMOL/L (ref 98–112)
CHOLEST SERPL-MCNC: 153 MG/DL (ref ?–200)
CLARITY UR REFRACT.AUTO: CLEAR
CO2 SERPL-SCNC: 22 MMOL/L (ref 21–32)
COLOR UR AUTO: COLORLESS
CREAT BLD-MCNC: 1.23 MG/DL
EGFRCR SERPLBLD CKD-EPI 2021: 84 ML/MIN/1.73M2 (ref 60–?)
ERYTHROCYTE [DISTWIDTH] IN BLOOD BY AUTOMATED COUNT: 12.2 %
EST. AVERAGE GLUCOSE BLD GHB EST-MCNC: 105 MG/DL (ref 68–126)
FASTING PATIENT LIPID ANSWER: YES
FASTING STATUS PATIENT QL REPORTED: YES
GLOBULIN PLAS-MCNC: 4.1 G/DL (ref 2.8–4.4)
GLUCOSE BLD-MCNC: 93 MG/DL (ref 70–99)
GLUCOSE UR STRIP.AUTO-MCNC: NORMAL MG/DL
HBA1C MFR BLD: 5.3 % (ref ?–5.7)
HCT VFR BLD AUTO: 46.8 %
HDLC SERPL-MCNC: 67 MG/DL (ref 40–59)
HGB BLD-MCNC: 16.6 G/DL
LDLC SERPL CALC-MCNC: 75 MG/DL (ref ?–100)
LEUKOCYTE ESTERASE UR QL STRIP.AUTO: NEGATIVE
MCH RBC QN AUTO: 29.5 PG (ref 26–34)
MCHC RBC AUTO-ENTMCNC: 35.5 G/DL (ref 31–37)
MCV RBC AUTO: 83.3 FL
NITRITE UR QL STRIP.AUTO: NEGATIVE
NONHDLC SERPL-MCNC: 86 MG/DL (ref ?–130)
OSMOLALITY SERPL CALC.SUM OF ELEC: 283 MOSM/KG (ref 275–295)
PH UR STRIP.AUTO: 6 [PH] (ref 5–8)
PLATELET # BLD AUTO: 282 10(3)UL (ref 150–450)
POTASSIUM SERPL-SCNC: 3.4 MMOL/L (ref 3.5–5.1)
PROT SERPL-MCNC: 8.7 G/DL (ref 6.4–8.2)
PROT UR STRIP.AUTO-MCNC: NEGATIVE MG/DL
RBC # BLD AUTO: 5.62 X10(6)UL
RBC UR QL AUTO: NEGATIVE
SODIUM SERPL-SCNC: 137 MMOL/L (ref 136–145)
SP GR UR STRIP.AUTO: 1.01 (ref 1–1.03)
TRIGL SERPL-MCNC: 55 MG/DL (ref 30–149)
TSI SER-ACNC: 1.71 MIU/ML (ref 0.36–3.74)
UROBILINOGEN UR STRIP.AUTO-MCNC: NORMAL MG/DL
VIT D+METAB SERPL-MCNC: 17.7 NG/ML (ref 30–100)
VLDLC SERPL CALC-MCNC: 8 MG/DL (ref 0–30)
WBC # BLD AUTO: 14.1 X10(3) UL (ref 4–11)

## 2024-03-01 PROCEDURE — 83036 HEMOGLOBIN GLYCOSYLATED A1C: CPT

## 2024-03-01 PROCEDURE — 73630 X-RAY EXAM OF FOOT: CPT | Performed by: PHYSICIAN ASSISTANT

## 2024-03-01 PROCEDURE — 81003 URINALYSIS AUTO W/O SCOPE: CPT

## 2024-03-01 PROCEDURE — 86038 ANTINUCLEAR ANTIBODIES: CPT

## 2024-03-01 PROCEDURE — 86225 DNA ANTIBODY NATIVE: CPT

## 2024-03-01 PROCEDURE — 80061 LIPID PANEL: CPT

## 2024-03-01 PROCEDURE — 36415 COLL VENOUS BLD VENIPUNCTURE: CPT

## 2024-03-01 PROCEDURE — 85027 COMPLETE CBC AUTOMATED: CPT

## 2024-03-01 PROCEDURE — 80053 COMPREHEN METABOLIC PANEL: CPT

## 2024-03-01 PROCEDURE — 99213 OFFICE O/P EST LOW 20 MIN: CPT | Performed by: PHYSICIAN ASSISTANT

## 2024-03-01 PROCEDURE — 84443 ASSAY THYROID STIM HORMONE: CPT

## 2024-03-01 PROCEDURE — 82306 VITAMIN D 25 HYDROXY: CPT

## 2024-03-01 RX ORDER — PREDNISONE 20 MG/1
40 TABLET ORAL DAILY
Qty: 10 TABLET | Refills: 0 | Status: SHIPPED | OUTPATIENT
Start: 2024-03-01 | End: 2024-03-06

## 2024-03-01 NOTE — DISCHARGE INSTRUCTIONS
Start Oral prednisone if redness spreads outside of the area travels up the leg you need to be reevaluated

## 2024-03-01 NOTE — ED PROVIDER NOTES
Patient Seen in: [facility]      History     Chief Complaint   Patient presents with    Leg or Foot Injury     Stated Complaint: right foot pain    Subjective:   HPI    23-year-old male who comes in today with chronic right foot pain on and off over the past year and a half but states that over the past 2 days he developed worsening pain to the point that he felt like he was unable to weight-bear.  Patient states that today there was a patch of redness noted to the lateral foot so he became concerned.  No break in the skin no fevers chills otherwise feels well no injury or trauma.  Patient denies any other symptoms at this time.    Objective:   History reviewed. No pertinent past medical history.           Past Surgical History:   Procedure Laterality Date    REMV PILONIDAL LESION SIMPLE N/A 12/22/2017    WISDOM TEETH REMOVED  2016                Social History     Socioeconomic History    Marital status: Single    Number of children: 0   Occupational History    Occupation: Student     Comment: senior   Tobacco Use    Smoking status: Never    Smokeless tobacco: Never   Vaping Use    Vaping Use: Former    Substances: Nicotine, quite 3la0uns 17    Devices: Pre-filled or refillable cartridge   Substance and Sexual Activity    Alcohol use: Yes     Alcohol/week: 14.0 standard drinks of alcohol     Types: 14 Cans of beer per week    Drug use: No    Sexual activity: Not Currently     Partners: Female   Other Topics Concern    Caffeine Concern Yes     Comment: 2-3 week if that    Exercise Yes     Comment: 3-4 days weekly    Seat Belt Yes              Review of Systems    Positive for stated complaint: right foot pain  Other systems are as noted in HPI.  Constitutional and vital signs reviewed.      All other systems reviewed and negative except as noted above.    Physical Exam     ED Triage Vitals [03/01/24 1241]   /85   Pulse 110   Resp 24   Temp 98 °F (36.7 °C)   Temp src Temporal   SpO2 97 %    O2 Device None (Room air)       Current:/85   Pulse 110   Temp 98 °F (36.7 °C) (Temporal)   Resp 24   SpO2 97%         Physical Exam  Vitals and nursing note reviewed.   Constitutional:       General: He is not in acute distress.     Appearance: Normal appearance. He is well-developed. He is not diaphoretic.   HENT:      Head: Normocephalic and atraumatic.   Cardiovascular:      Rate and Rhythm: Normal rate and regular rhythm.   Pulmonary:      Effort: Pulmonary effort is normal. No respiratory distress.      Breath sounds: Normal breath sounds.   Musculoskeletal:      Cervical back: Normal range of motion.   Skin:     General: Skin is warm and dry.      Capillary Refill: Capillary refill takes less than 2 seconds.      Coloration: Skin is not pale.      Findings: Erythema (6cm x 4.5cm on right lateral foot) present. No rash.   Neurological:      Mental Status: He is alert and oriented to person, place, and time.      Motor: No abnormal muscle tone.      Coordination: Coordination normal.      Deep Tendon Reflexes: Reflexes are normal and symmetric.   Psychiatric:         Behavior: Behavior normal.         Thought Content: Thought content normal.         Judgment: Judgment normal.                   ED Course   Labs Reviewed - No data to display     XR FOOT, COMPLETE (MIN 3 VIEWS), RIGHT (CPT=73630)    Result Date: 3/1/2024  PROCEDURE:  XR FOOT, COMPLETE (MIN 3 VIEWS), RIGHT (CPT=73630)  TECHNIQUE:  AP, oblique, and lateral views were obtained.  COMPARISON:  None.  INDICATIONS:  Intermittent right foot pain for the past 1.5 years in the region of the base of the 5th metatarsal.  PATIENT STATED HISTORY: (As transcribed by Technologist)  Pt c/o pain in right foot, intermittently for past year and half; most recently over past 2-3x days. States it is painful, but then improves on it's own. Base of the 5th metatarsal area.    FINDINGS:  BONES:  Normal.  No significant arthropathy or acute abnormality. SOFT  TISSUES:  Negative.  No visible soft tissue swelling. EFFUSION:  None visible. OTHER:  Negative.            CONCLUSION:  Negative exam.   LOCATION:  [facility]   Dictated by (CST): Neftali Ferguson DO on 3/01/2024 at 1:35 PM     Finalized by (CST): Neftali Ferguson DO on 3/01/2024 at 1:36 PM             MDM             Medical Decision Making  3-year-old male with redness to the right lateral aspect of the foot and pain that has been on and off now for a year and a half.  No fevers no chills otherwise feels well.    Problems Addressed:  Gouty arthritis of right foot: acute illness or injury  Right foot pain: acute illness or injury    Amount and/or Complexity of Data Reviewed  Radiology: ordered and independent interpretation performed. Decision-making details documented in ED Course.     Details: Personally reviewed the patient's foot  x-ray no evidence of acute fracture or dislocation    Risk  OTC drugs.  Prescription drug management.  Risk Details: Clinical Impression: Foot pain, gouty arthritis      The differential diagnosis before testing included sinus, gouty arthritis, foot sprain, which is a medical condition that poses a threat to life/function.     Discussion was had with the patient and patient has redness to the lateral aspect of the foot there is no obvious signs of skin breakage.  Patient is extremely sensitive like he would be with gout.  We discussed the possibilities.  Area was demarcated if the area worsens needs to be reevaluated.        Disposition and Plan     Clinical Impression:  1. Gouty arthritis of right foot    2. Right foot pain         Disposition:  Discharge  3/1/2024  2:06 pm    Follow-up:  Vida Haywood MD  [address]  [facility] [address]  [phone]    Schedule an appointment as soon as possible for a visit   If symptoms worsen          Medications Prescribed:  Discharge Medication List as of 3/1/2024  2:13 PM        START taking these medications    Details   predniSONE 20  MG Oral Tab Take 2 tablets (40 mg total) by mouth daily for 5 days., Normal, Disp-10 tablet, R-0

## 2024-03-01 NOTE — ED INITIAL ASSESSMENT (HPI)
Pt c/o pain in right foot, intermittently for past year and half; most recently over past 2-3x days. States it is painful, but then improves on it's own

## 2024-03-04 LAB
DSDNA IGG SERPL IA-ACNC: 4.7 IU/ML
ENA AB SER QL IA: 0.4 UG/L
ENA AB SER QL IA: NEGATIVE

## 2024-04-29 ENCOUNTER — PATIENT MESSAGE (OUTPATIENT)
Dept: FAMILY MEDICINE CLINIC | Facility: CLINIC | Age: 24
End: 2024-04-29

## 2024-04-29 NOTE — TELEPHONE ENCOUNTER
From: Thad Bass  To: Jasvir Silveira  Sent: 4/29/2024 4:20 PM CDT  Subject: GOUT?    Hi , I was seen in March at urgent care for \"gout\" in my foot and was given steriods to help with the inflammation. It is happening again on my foot and I can't get a video visit with you till next week. I need a more long term solution moving forward if this is going to be happening to me. Is there any way I can get prednisone for this and make a follow up with whoever you recommend? Thanks for your guidance on this.    Thad

## 2024-04-30 ENCOUNTER — TELEMEDICINE (OUTPATIENT)
Dept: FAMILY MEDICINE CLINIC | Facility: CLINIC | Age: 24
End: 2024-04-30
Payer: COMMERCIAL

## 2024-04-30 DIAGNOSIS — M10.9 ACUTE GOUT OF RIGHT FOOT, UNSPECIFIED CAUSE: Primary | ICD-10-CM

## 2024-04-30 PROCEDURE — 99213 OFFICE O/P EST LOW 20 MIN: CPT | Performed by: FAMILY MEDICINE

## 2024-04-30 RX ORDER — PREDNISONE 20 MG/1
40 TABLET ORAL DAILY
Qty: 10 TABLET | Refills: 0 | Status: SHIPPED | OUTPATIENT
Start: 2024-04-30 | End: 2024-05-05

## 2024-04-30 RX ORDER — ALLOPURINOL 100 MG/1
100 TABLET ORAL DAILY
Qty: 90 TABLET | Refills: 1 | Status: SHIPPED | OUTPATIENT
Start: 2024-04-30

## 2024-04-30 NOTE — PROGRESS NOTES
Chief Complaint   Patient presents with    Foot Pain     R      Thad Bass is a 23 year old male who presents via video encounter.    HPI:   Calling re: recurrent gout.  On and off for 1-2 yrs.  Always the same foot - R foot, on the R side.    Pt reports it looks similar to the pictures taken when he was at the     The night before sometimes feels a mild pain, but by the time he wakes up the pain is quite significant.      This runs in the family.  Quite severe in some members.    No changes in diet.        REVIEW OF SYSTEMS:  Pertinent items are noted in HPI.      Physical Exam:  Healthy appearing  Reviewed photos of past episode of R foot redness and swelling.         Assessment and Plan  Diagnoses and all orders for this visit:    Acute gout of right foot, unspecified cause  -     Uric Acid; Future  -     predniSONE 20 MG Oral Tab; Take 2 tablets (40 mg total) by mouth daily for 5 days.  -     allopurinol 100 MG Oral Tab; Take 1 tablet (100 mg total) by mouth daily.       Issues similar to this a few weeks ago.  Diagnosed with gout.  It has recurred  No clear trigger, but does note that gout runs in the family  We discussed acute treatment.  Centered on staying hydrated, ibuprofen or Aleve use.  Will start prednisone to help now.  Stay hydrated    Need to figure out trigger.  May be dietary.  Recommend he log food.  Watch for meat, cheese, alcohol seafood triggers .    For long term prevention, consider allopurinol.  Start at 100mg daily.  Discussed side effects. Start after acute episode.           This visit is conducted using Telemedicine with live, interactive video and audio.  Patient was in [place] at the time of the encounter.

## 2024-05-01 ENCOUNTER — LAB ENCOUNTER (OUTPATIENT)
Dept: LAB | Age: 24
End: 2024-05-01
Attending: FAMILY MEDICINE
Payer: COMMERCIAL

## 2024-05-01 DIAGNOSIS — M10.9 ACUTE GOUT OF RIGHT FOOT, UNSPECIFIED CAUSE: ICD-10-CM

## 2024-05-01 LAB — URATE SERPL-MCNC: 7.8 MG/DL

## 2024-05-01 PROCEDURE — 84550 ASSAY OF BLOOD/URIC ACID: CPT

## 2024-05-01 PROCEDURE — 36415 COLL VENOUS BLD VENIPUNCTURE: CPT

## 2024-10-27 DIAGNOSIS — M10.9 ACUTE GOUT OF RIGHT FOOT, UNSPECIFIED CAUSE: ICD-10-CM

## 2024-10-30 RX ORDER — ALLOPURINOL 100 MG/1
100 TABLET ORAL DAILY
Qty: 90 TABLET | Refills: 1 | Status: SHIPPED | OUTPATIENT
Start: 2024-10-30

## 2024-10-30 NOTE — TELEPHONE ENCOUNTER
Refill request for:    Requested Prescriptions     Pending Prescriptions Disp Refills    ALLOPURINOL 100 MG Oral Tab [Pharmacy Med Name: ALLOPURINOL 100 MG TABLET] 90 tablet 1     Sig: TAKE 1 TABLET BY MOUTH EVERY DAY        Last Prescribed Quantity Refills   04/30/24 90 1     LOV 2/27/2024     Patient was asked to follow-up in:  4 weeks    Appointment due: November 2024    Appointment scheduled: Visit date not found    Medication not on protocol.     # 90 with 1 refills routed to Jasvir Silveira MD for review

## 2025-01-08 ENCOUNTER — PATIENT MESSAGE (OUTPATIENT)
Dept: FAMILY MEDICINE CLINIC | Facility: CLINIC | Age: 25
End: 2025-01-08

## 2025-01-08 DIAGNOSIS — M79.673 PAIN OF FOOT, UNSPECIFIED LATERALITY: Primary | ICD-10-CM

## 2025-01-21 ENCOUNTER — TELEPHONE (OUTPATIENT)
Facility: CLINIC | Age: 25
End: 2025-01-21

## 2025-01-21 DIAGNOSIS — M79.671 BILATERAL FOOT PAIN: Primary | ICD-10-CM

## 2025-01-21 DIAGNOSIS — M79.672 BILATERAL FOOT PAIN: Primary | ICD-10-CM

## 2025-01-21 NOTE — TELEPHONE ENCOUNTER
New patient scheduled for gout pain of the left foot (was the right a week ago), ref by pcp (Pain of foot, unspecified laterality )    Future Appointments   Date Time Provider Department Center   1/22/2025 10:30 AM Tenisha Buckner DPM [facility]     Please advise if any imaging is needed.

## 2025-01-22 ENCOUNTER — HOSPITAL ENCOUNTER (OUTPATIENT)
Dept: GENERAL RADIOLOGY | Age: 25
Discharge: HOME | End: 2025-01-22
Attending: PODIATRIST
Payer: COMMERCIAL

## 2025-01-22 ENCOUNTER — OFFICE VISIT (OUTPATIENT)
Dept: ORTHOPEDICS CLINIC | Facility: CLINIC | Age: 25
End: 2025-01-22
Payer: COMMERCIAL

## 2025-01-22 VITALS — BODY MASS INDEX: 23.72 KG/M2 | HEIGHT: 73 IN | WEIGHT: 179 LBS

## 2025-01-22 DIAGNOSIS — M79.671 BILATERAL FOOT PAIN: ICD-10-CM

## 2025-01-22 DIAGNOSIS — M79.672 BILATERAL FOOT PAIN: ICD-10-CM

## 2025-01-22 DIAGNOSIS — M1A.0720 CHRONIC GOUT OF LEFT ANKLE, UNSPECIFIED CAUSE: Primary | ICD-10-CM

## 2025-01-22 PROCEDURE — 29580 STRAPPING UNNA BOOT: CPT | Performed by: PODIATRIST

## 2025-01-22 PROCEDURE — 73630 X-RAY EXAM OF FOOT: CPT | Performed by: PODIATRIST

## 2025-01-22 PROCEDURE — 20605 DRAIN/INJ JOINT/BURSA W/O US: CPT | Performed by: PODIATRIST

## 2025-01-22 PROCEDURE — 99204 OFFICE O/P NEW MOD 45 MIN: CPT | Performed by: PODIATRIST

## 2025-01-22 RX ORDER — BETAMETHASONE SODIUM PHOSPHATE AND BETAMETHASONE ACETATE 3; 3 MG/ML; MG/ML
6 INJECTION, SUSPENSION INTRA-ARTICULAR; INTRALESIONAL; INTRAMUSCULAR; SOFT TISSUE ONCE
Status: COMPLETED | OUTPATIENT
Start: 2025-01-22 | End: 2025-01-22

## 2025-01-22 RX ORDER — METHYLPREDNISOLONE 4 MG/1
TABLET ORAL
Qty: 1 EACH | Refills: 2 | Status: SHIPPED | OUTPATIENT
Start: 2025-01-22

## 2025-01-22 RX ADMIN — BETAMETHASONE SODIUM PHOSPHATE AND BETAMETHASONE ACETATE 6 MG: 3; 3 INJECTION, SUSPENSION INTRA-ARTICULAR; INTRALESIONAL; INTRAMUSCULAR; SOFT TISSUE at 11:30:00

## 2025-01-22 NOTE — PROGRESS NOTES
[facility] New Patient Note    CC:   Chief Complaint   Patient presents with    Foot Pain     Left Foot Pain, gout pain  Onset: 4 days       HPI: The patient is a 24 year old male who presents today with complaints of pain and swelling involving both ankles and feet, more so the left foot and ankle today.    Uric acid level when taken was high normal.  Seems to always affect his ankles.  Over summer, started allopurinal per Dr Silveira.  He thinks it has helped some    Had in right 2 weeks ago  Left foot started couple days ago    No injury.  He is active but sits most of day    Mom's dad had gout    History reviewed. No pertinent past medical history.  Past Surgical History:   Procedure Laterality Date    Remv pilonidal lesion simple N/A 12/22/2017    [place] teeth removed  2016     Current Outpatient Medications   Medication Sig Dispense Refill    methylPREDNISolone (MEDROL) 4 MG Oral Tablet Therapy Pack As directed. 1 each 2    ALLOPURINOL 100 MG Oral Tab TAKE 1 TABLET BY MOUTH EVERY DAY 90 tablet 1    escitalopram 10 MG Oral Tab Take 1 tablet (10 mg total) by mouth daily. (Patient not taking: Reported on 1/22/2025) 90 tablet 0    mometasone furoate 50 MCG/ACT Nasal Suspension 1 spray by Nasal route daily. (Patient not taking: Reported on 1/22/2025) 1 each 0    Sulfacetamide Sodium, Acne, (KLARON) 10 % External Lotion Apply to face once daily (Patient not taking: Reported on 1/22/2025) 118 mL 2    Adapalene 0.3 % External Gel Apply QHS to face, chest, and back (Patient not taking: Reported on 1/22/2025) 45 g 2     Allergies[1]  History reviewed. No pertinent family history.  Social History     Occupational History    Occupation: Student     Comment: senior   Tobacco Use    Smoking status: Never    Smokeless tobacco: Never   Vaping Use    Vaping status: Former    Substances: Nicotine, quite 4dv2afp 17    Devices: Pre-filled or refillable cartridge   Substance and Sexual Activity    Alcohol use: Yes      Alcohol/week: 14.0 standard drinks of alcohol     Types: 14 Cans of beer per week    Drug use: No    Sexual activity: Not Currently     Partners: Female        ROS:  Complete ROS reviewed by me and non-contributory to the chief complaint except as mentioned above.    Physical Exam:    Ht 6' 1\" (1.854 m)   Wt 179 lb (81.2 kg)   BMI 23.62 kg/m²       Exam right ankle minimal pain or swelling today.  Left ankle sinus tarsi and anterior lateral ankle very sore increased erythema edema, warmth and bright erythema suggestive of gout.  Painful plantarflexion inversion.  Painful ankle joint range of motion and subtalar joint range of motion.  High arch foot type upon stance bilateral  Sensation is intact sharp versus dull. He can feel light touch to the tips of the toes  Palpable pedal pulses.  Hair growth is present.  Skin is supple and feet are well perfused and warm.    Strength is 5 out of 5 all muscle groups    Full range of motion of the ankle joint, subtalar joint, MP joints, and midfoot joints.     Imaging: X-rays bilateral foot views show the sinus tarsi to be clear.  1 view of the ankle joint appears unremarkable.  Increased soft tissue density noted.  Personally viewed, independently interpreted and radiology report read.      Assessment/Diagnoses:  Diagnoses and all orders for this visit:    Chronic gout of left ankle, unspecified cause  -     DME - EXTERNAL   -     DRAIN/INJECT MEDIUM JOINT/BURSA  -     betamethasone sodium phosphate & acetate ([place]elestone) 6 (3-3) MG/ML injection 6 mg    Other orders  -     methylPREDNISolone (MEDROL) 4 MG Oral Tablet Therapy Pack; As directed.        Plan:  I reviewed imaging and exam findings with the patient.  His x-rays are normal but he is clinical signs of an acute gout attack of the left ankle and subtalar joint.  We discussed what gout is of the uric acid affects the joints and the treatment plan.  Surgical and nonsurgical options.    I did touch base with Dr. Silveira  and he is going to increase his allopurinol  Offered steroid injection of the ankle joint  Risks and benefits discussed.  Sterile prep of affected left ankle  Injection of 1cc of betamethasone phosphate to ankle joint  He tolerated well .  Rest next 2-3 days    Offered unna boot for cooling, compression and splinting affect-leave on 2-3 days prn  Also low-profile boot to use to help him get around more comfortably    He has used Medrol in the past and we renew this.  It is a little more effective if he takes it at the first signs of a gout attack  We discussed the mechanism of how this helps.    Follow up in about 3 weeks    Tenisha Buckner, DPMPodiatric Surgery  [facility]  [address], [address], [place] [id number] [address] [place] [address] [email or web address]  [email or web address]  t: [phone]   f: [phone]              This document was partially prepared using Dragon Medical voice recognition software.            [1] No Known Allergies

## 2025-02-19 ENCOUNTER — OFFICE VISIT (OUTPATIENT)
Dept: ORTHOPEDICS CLINIC | Facility: CLINIC | Age: 25
End: 2025-02-19
Payer: COMMERCIAL

## 2025-02-19 VITALS — BODY MASS INDEX: 24.52 KG/M2 | HEIGHT: 73 IN | WEIGHT: 185 LBS

## 2025-02-19 DIAGNOSIS — M1A.0720 CHRONIC GOUT OF LEFT ANKLE, UNSPECIFIED CAUSE: Primary | ICD-10-CM

## 2025-02-19 PROCEDURE — 99213 OFFICE O/P EST LOW 20 MIN: CPT | Performed by: PODIATRIST

## 2025-02-19 NOTE — PROGRESS NOTES
[facility] Progress Note    Subjective:     Patient is here for follow-up of an episode of gout involving both ankle areas  Since our visit he is on 300 mg allopurinol per Dr. Silveira.  He is feeling better.  The injection and boot was helpful.        Objective:     No palpable tenderness today no swelling no erythema bilateral lower extremity          Imaging: No new x-ray        Assessment/Plan:     Diagnoses and all orders for this visit:    Chronic gout of left ankle, unspecified cause        He has improved and we discussed taking the Medrol at the first sign of a recurrent attack.  Also following up for any topical symptomatic treatment that we can do if he has another episode.      Tenisha Buckner, DPMPodiatric Surgery  [facility]  [facility]  [address], [address], [place] [id number] [address] [place], [place] [id number] [place] [email or web address]  [email or web address]  t: [phone]   f: [phone]            Dragon speech recognition software was used to prepare this note. If a word or phrase is confusing, it is likely do to a failure of recognition. Please contact me with any questions or clarifications.

## 2025-04-10 ENCOUNTER — TELEPHONE (OUTPATIENT)
Dept: FAMILY MEDICINE CLINIC | Facility: CLINIC | Age: 25
End: 2025-04-10

## 2025-04-10 DIAGNOSIS — Z00.00 LABORATORY EXAM ORDERED AS PART OF ROUTINE GENERAL MEDICAL EXAMINATION: Primary | ICD-10-CM

## 2025-04-10 NOTE — TELEPHONE ENCOUNTER
Please enter lab orders for the patient's upcoming physical appointment.     Physical scheduled:   Your appointments       Date & Time Appointment Department ([place])    Apr 29, 2025 10:30 AM CDT Adult Physical with Jasvir Silveira MD [facility] [place] ([facility])    PLEASE NOTE - Most insurances allow a Complete Physical once every 366 days. Please schedule accordingly.    Please arrive 15 minutes prior to your scheduled appointment. Please also bring your Insurance card, Photo ID, and your medication bottles or a list of your current medication.    If you no longer require this appointment, please contact your physician office to cancel.              [facility]  [address] [address]  [phone]           Preferred lab: [facility] ([facility])     The patient has been notified to complete fasting labs prior to their physical appointment.

## 2025-05-19 ENCOUNTER — OFFICE VISIT (OUTPATIENT)
Dept: FAMILY MEDICINE CLINIC | Facility: CLINIC | Age: 25
End: 2025-05-19
Payer: COMMERCIAL

## 2025-05-19 ENCOUNTER — LAB ENCOUNTER (OUTPATIENT)
Dept: LAB | Age: 25
End: 2025-05-19
Attending: FAMILY MEDICINE
Payer: COMMERCIAL

## 2025-05-19 VITALS
SYSTOLIC BLOOD PRESSURE: 110 MMHG | HEART RATE: 69 BPM | OXYGEN SATURATION: 97 % | WEIGHT: 208.25 LBS | RESPIRATION RATE: 16 BRPM | HEIGHT: 73 IN | DIASTOLIC BLOOD PRESSURE: 80 MMHG | BODY MASS INDEX: 27.6 KG/M2

## 2025-05-19 DIAGNOSIS — Z00.00 LABORATORY EXAM ORDERED AS PART OF ROUTINE GENERAL MEDICAL EXAMINATION: ICD-10-CM

## 2025-05-19 DIAGNOSIS — Z87.39 HISTORY OF GOUT: ICD-10-CM

## 2025-05-19 DIAGNOSIS — M79.673 PAIN OF FOOT, UNSPECIFIED LATERALITY: ICD-10-CM

## 2025-05-19 DIAGNOSIS — Z00.00 ANNUAL PHYSICAL EXAM: Primary | ICD-10-CM

## 2025-05-19 LAB
ALBUMIN SERPL-MCNC: 5.2 G/DL (ref 3.2–4.8)
ALBUMIN/GLOB SERPL: 1.9 {RATIO} (ref 1–2)
ALP LIVER SERPL-CCNC: 69 U/L (ref 45–117)
ALT SERPL-CCNC: 29 U/L (ref 10–49)
ANION GAP SERPL CALC-SCNC: 8 MMOL/L (ref 0–18)
AST SERPL-CCNC: 24 U/L (ref ?–34)
BASOPHILS # BLD AUTO: 0.03 X10(3) UL (ref 0–0.2)
BASOPHILS NFR BLD AUTO: 0.4 %
BILIRUB SERPL-MCNC: 0.7 MG/DL (ref 0.3–1.2)
BUN BLD-MCNC: 12 MG/DL (ref 9–23)
CALCIUM BLD-MCNC: 9.9 MG/DL (ref 8.7–10.6)
CHLORIDE SERPL-SCNC: 105 MMOL/L (ref 98–112)
CHOLEST SERPL-MCNC: 161 MG/DL (ref ?–200)
CO2 SERPL-SCNC: 27 MMOL/L (ref 21–32)
CREAT BLD-MCNC: 1.2 MG/DL (ref 0.7–1.3)
EGFRCR SERPLBLD CKD-EPI 2021: 86 ML/MIN/1.73M2 (ref 60–?)
EOSINOPHIL # BLD AUTO: 0.53 X10(3) UL (ref 0–0.7)
EOSINOPHIL NFR BLD AUTO: 6.8 %
ERYTHROCYTE [DISTWIDTH] IN BLOOD BY AUTOMATED COUNT: 13 %
EST. AVERAGE GLUCOSE BLD GHB EST-MCNC: 105 MG/DL (ref 68–126)
FASTING PATIENT LIPID ANSWER: YES
FASTING STATUS PATIENT QL REPORTED: YES
GLOBULIN PLAS-MCNC: 2.8 G/DL (ref 2–3.5)
GLUCOSE BLD-MCNC: 90 MG/DL (ref 70–99)
HBA1C MFR BLD: 5.3 % (ref ?–5.7)
HCT VFR BLD AUTO: 47.1 % (ref 39–53)
HDLC SERPL-MCNC: 61 MG/DL (ref 40–59)
HGB BLD-MCNC: 16 G/DL (ref 13–17.5)
IMM GRANULOCYTES # BLD AUTO: 0.02 X10(3) UL (ref 0–1)
IMM GRANULOCYTES NFR BLD: 0.3 %
LDLC SERPL CALC-MCNC: 85 MG/DL (ref ?–100)
LYMPHOCYTES # BLD AUTO: 2.79 X10(3) UL (ref 1–4)
LYMPHOCYTES NFR BLD AUTO: 36 %
MCH RBC QN AUTO: 29.9 PG (ref 26–34)
MCHC RBC AUTO-ENTMCNC: 34 G/DL (ref 31–37)
MCV RBC AUTO: 88 FL (ref 80–100)
MONOCYTES # BLD AUTO: 0.4 X10(3) UL (ref 0.1–1)
MONOCYTES NFR BLD AUTO: 5.2 %
NEUTROPHILS # BLD AUTO: 3.97 X10 (3) UL (ref 1.5–7.7)
NEUTROPHILS # BLD AUTO: 3.97 X10(3) UL (ref 1.5–7.7)
NEUTROPHILS NFR BLD AUTO: 51.3 %
NONHDLC SERPL-MCNC: 100 MG/DL (ref ?–130)
OSMOLALITY SERPL CALC.SUM OF ELEC: 289 MOSM/KG (ref 275–295)
PLATELET # BLD AUTO: 266 10(3)UL (ref 150–450)
POTASSIUM SERPL-SCNC: 4.1 MMOL/L (ref 3.5–5.1)
PROT SERPL-MCNC: 8 G/DL (ref 5.7–8.2)
RBC # BLD AUTO: 5.35 X10(6)UL (ref 4.3–5.7)
SODIUM SERPL-SCNC: 140 MMOL/L (ref 136–145)
TRIGL SERPL-MCNC: 82 MG/DL (ref 30–149)
TSI SER-ACNC: 1.06 UIU/ML (ref 0.55–4.78)
URATE SERPL-MCNC: 10.6 MG/DL (ref 3.7–9.2)
VLDLC SERPL CALC-MCNC: 13 MG/DL (ref 0–30)
WBC # BLD AUTO: 7.7 X10(3) UL (ref 4–11)

## 2025-05-19 PROCEDURE — 36415 COLL VENOUS BLD VENIPUNCTURE: CPT

## 2025-05-19 PROCEDURE — 84550 ASSAY OF BLOOD/URIC ACID: CPT

## 2025-05-19 PROCEDURE — 80061 LIPID PANEL: CPT

## 2025-05-19 PROCEDURE — 99395 PREV VISIT EST AGE 18-39: CPT | Performed by: FAMILY MEDICINE

## 2025-05-19 PROCEDURE — 80053 COMPREHEN METABOLIC PANEL: CPT

## 2025-05-19 PROCEDURE — 84443 ASSAY THYROID STIM HORMONE: CPT

## 2025-05-19 PROCEDURE — 83036 HEMOGLOBIN GLYCOSYLATED A1C: CPT

## 2025-05-19 PROCEDURE — 85025 COMPLETE CBC W/AUTO DIFF WBC: CPT

## 2025-05-19 RX ORDER — ALLOPURINOL 300 MG/1
300 TABLET ORAL DAILY
Qty: 90 TABLET | Refills: 3 | Status: SHIPPED | OUTPATIENT
Start: 2025-05-19

## 2025-05-19 NOTE — PROGRESS NOTES
Chief Complaint   Patient presents with    Physical     Patient here for physical       HPI:   Thad Bass is a 24 year old male who presents for a complete physical exam.     Last colonoscopy:  N/a - screening at 45yo   Last PSA:  N/a - screening at 49   Immunizations: TDaP 2011.      Foot - seeing podiatry for chronic gout.  Taking allopurinol for prevention.  Uric acid level a year ago 7.8.  new labs ordered. Foot has been better.  No attacks since he saw Dr. Buckner.    Watching foot choices.     Ortho - some back pains when waking up, but better of late.      Wt Readings from Last 6 Encounters:   05/19/25 208 lb 4 oz (94.5 kg)   02/19/25 185 lb (83.9 kg)   01/22/25 179 lb (81.2 kg)   02/27/24 179 lb 6 oz (81.4 kg)   10/30/23 176 lb (79.8 kg)   09/05/23 174 lb (78.9 kg)     Body mass index is 27.48 kg/m².     Chemistry Labs:   Lab Results   Component Value Date/Time    GLU 93 03/01/2024 02:29 PM     03/01/2024 02:29 PM    K 3.4 (L) 03/01/2024 02:29 PM     03/01/2024 02:29 PM    CO2 22.0 03/01/2024 02:29 PM    CREATSERUM 1.23 03/01/2024 02:29 PM    CA 10.0 03/01/2024 02:29 PM    ALB 4.6 03/01/2024 02:29 PM    TP 8.7 (H) 03/01/2024 02:29 PM    ALKPHO 72 03/01/2024 02:29 PM    AST 20 03/01/2024 02:29 PM    ALT 27 03/01/2024 02:29 PM    BILT 0.9 03/01/2024 02:29 PM          Cholesterol  (most recent labs)   Lab Results   Component Value Date/Time    CHOLEST 153 03/01/2024 02:29 PM    HDL 67 (H) 03/01/2024 02:29 PM    LDL 75 03/01/2024 02:29 PM    TRIG 55 03/01/2024 02:29 PM      No results found for: \"PSA\"      Current Medications[1]   Past Medical History[2]   Past Surgical History[3]   Family History[4]   Social History:  Short Social Hx on File[5]     Occ: sales - basement remodeling.  [employer].   : no. Children: no.   Exercise: gym - 5-6 times a week. Strength training.  Some cardio.    Diet: \"not great\".  Working on eating healthier.       REVIEW OF SYSTEMS:     All systems reviewed,  negative other than noted above.  Bubbles in urine stream.     EXAM:   /80   Pulse 69   Resp 16   Ht 6' 1\" (1.854 m)   Wt 208 lb 4 oz (94.5 kg)   SpO2 97%   BMI 27.48 kg/m²   Body mass index is 27.48 kg/m².     General appearance: alert, appears stated age and cooperative  Eyes: conjunctivae/corneas clear. PERRL, EOM's intact.   Ears: normal TM's and external ear canals both ears  Neck: no adenopathy, no JVD, supple, symmetrical, trachea midline and thyroid not enlarged, symmetric, no tenderness/mass/nodules  Lungs: clear to auscultation bilaterally  Heart: S1, S2 normal, no murmur, click, rub or gallop, regular rate and rhythm  Abdomen: soft, non-tender; bowel sounds normal; no masses,  no organomegaly  Extremities: extremities normal, atraumatic, no cyanosis or edema  Pulses: 2+ and symmetric  Neurologic: Alert and oriented X 3, normal strength and tone. Normal symmetric reflexes. Normal coordination and gait     ASSESSMENT AND PLAN:     Thad Hussein Bass was seen in the office today:  had concerns including Physical (Patient here for physical ).    1. Annual physical exam  Overall well  Regular exercise.  Diet is mostly controlled.  Some bed meals from time to time, but he is aware and working on being a little bit more regular  Discussed immunizations.  Eligible for Tdap, but he is declining today  Continue making healthy choices.  Safe travels for him this summer.     2. History of gout  3. Pain of foot, unspecified laterality  Taking allopurinol.  This has been effective  Does have a brother with gout as well, seems to be some hereditary leg.  Continue to be mindful of diet  Will continue the medicine.  Check uric acid levels upcoming  - allopurinol 300 MG Oral Tab; Take 1 tablet (300 mg total) by mouth daily.  Dispense: 90 tablet; Refill: 3          Jasvir Silveira M.D.   [facility]  05/19/25        Note to patient: The 21 Century Cures Act makes medical notes like these available  to patients in the interest of transparency. However, be advised this is a medical document. It is intended as peer to peer communication. It is written in medical language and may contain abbreviations or verbiage that are unfamiliar. It may appear blunt or direct. Medical documents are intended to carry relevant information, facts as evident, and the clinical opinion of the practitioner.            [1]   Current Outpatient Medications   Medication Sig Dispense Refill    allopurinol 300 MG Oral Tab Take 1 tablet (300 mg total) by mouth daily. 90 tablet 1   [2] No past medical history on file.  [3]   Past Surgical History:  Procedure Laterality Date    Remv pilonidal lesion simple N/A 12/22/2017    [place] teeth removed  2016   [4] No family history on file.  [5]   Social History  Socioeconomic History    Marital status: Single    Number of children: 0   Occupational History    Occupation: Student     Comment: senior   Tobacco Use    Smoking status: Never    Smokeless tobacco: Never   Vaping Use    Vaping status: Former    Substances: Nicotine, quite [id number] 17    Devices: Pre-filled or refillable cartridge   Substance and Sexual Activity    Alcohol use: Yes     Alcohol/week: 14.0 standard drinks of alcohol     Types: 14 Cans of beer per week    Drug use: No    Sexual activity: Not Currently     Partners: Female   Other Topics Concern    Caffeine Concern Yes     Comment: 2-3 week if that    Exercise Yes     Comment: 3-4 days weekly    Seat Belt Yes     Social Drivers of Health     Food Insecurity: No Food Insecurity (5/19/2025)    NCSS - Food Insecurity     Worried About Running Out of Food in the Last Year: No     Ran Out of Food in the Last Year: No   Transportation Needs: No Transportation Needs (5/19/2025)    NCSS - Transportation     Lack of Transportation: No   Housing Stability: Not At Risk (5/19/2025)    NCSS - Housing/Utilities     Has Housing: Yes     Worried About Losing Housing: No     Unable to Get  Utilities: No

## 2025-06-23 ENCOUNTER — TELEPHONE (OUTPATIENT)
Dept: ORTHOPEDICS CLINIC | Facility: CLINIC | Age: 25
End: 2025-06-23

## 2025-06-23 NOTE — TELEPHONE ENCOUNTER
Called patient to obtain details for Family Medical Leave Act. Unable to leave a voicemail mailbox is full, will send [employer] message. PLEASE CONFIRM WITH patient WHO IS TO COMPLETE THE FORMS, DR Buckner or [facility] provider. Please see email received.

## 2025-06-23 NOTE — TELEPHONE ENCOUNTER
Received Family Medical Leave Act FORMS VIA E-MAIL for intermittent. Sent [employer] message for authorization. Logged for processing.

## 2025-07-26 ENCOUNTER — PATIENT MESSAGE (OUTPATIENT)
Dept: ORTHOPEDICS CLINIC | Facility: CLINIC | Age: 25
End: 2025-07-26

## 2025-08-05 ENCOUNTER — OFFICE VISIT (OUTPATIENT)
Dept: PODIATRY CLINIC | Facility: CLINIC | Age: 25
End: 2025-08-05

## 2025-08-05 VITALS — DIASTOLIC BLOOD PRESSURE: 90 MMHG | SYSTOLIC BLOOD PRESSURE: 138 MMHG

## 2025-08-05 DIAGNOSIS — M79.675 PAIN OF TOE OF LEFT FOOT: ICD-10-CM

## 2025-08-05 DIAGNOSIS — L60.0 ONYCHOCRYPTOSIS: Primary | ICD-10-CM

## 2025-08-05 PROCEDURE — 11730 AVULSION NAIL PLATE SIMPLE 1: CPT

## 2025-08-05 RX ORDER — CEPHALEXIN 500 MG/1
500 CAPSULE ORAL 2 TIMES DAILY
Qty: 14 CAPSULE | Refills: 0 | Status: SHIPPED | OUTPATIENT
Start: 2025-08-05 | End: 2025-08-12

## 2025-08-05 RX ORDER — CEPHALEXIN 500 MG/1
500 CAPSULE ORAL 2 TIMES DAILY
Qty: 14 CAPSULE | Refills: 0 | Status: SHIPPED | OUTPATIENT
Start: 2025-08-05 | End: 2025-08-05

## (undated) NOTE — LETTER
[address]  [facility], [address]       10/09/17        Patient: Kevin Nick   YOB: 2000   Date of Visit: 10/9/2017       Dear  Dr. Palak Boateng MD,      Thank you for referring Kevin Nick

## (undated) NOTE — LETTER
No referring provider defined for this encounter. 12/26/17        Patient: Luba Leon   YOB: 2000   Date of Visit: 12/26/2017       Dear  Dr. Marcus Eaton MD,      Thank you for referring Luba Leon to my practice.   Ple

## (undated) NOTE — LETTER
1/11/2018          To Whom It May Concern:    Patricia Haynes is currently under my medical care and may not return to gym class at this time. Please excuse Mely Fallon until January 29, 2018. He may return to gun class on January 29, 2018.   If you re

## (undated) NOTE — LETTER
12/26/2017          To Whom It May Concern:    Carolin Leach is currently under my medical care. Please allow pt. To stand during class as needed due to a surgical procedure. Pt. May not due any strenuous activity, or contact sports.   Pt. Will be se